# Patient Record
Sex: MALE | Race: WHITE
[De-identification: names, ages, dates, MRNs, and addresses within clinical notes are randomized per-mention and may not be internally consistent; named-entity substitution may affect disease eponyms.]

---

## 2019-02-06 ENCOUNTER — HOSPITAL ENCOUNTER (EMERGENCY)
Dept: HOSPITAL 80 - FED | Age: 24
Discharge: HOME | End: 2019-02-06
Payer: COMMERCIAL

## 2019-02-06 VITALS — SYSTOLIC BLOOD PRESSURE: 127 MMHG | DIASTOLIC BLOOD PRESSURE: 91 MMHG

## 2019-02-06 DIAGNOSIS — S93.602A: Primary | ICD-10-CM
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
## 2019-02-06 NOTE — EDPHY
H & P


Stated Complaint: inj lateral l foot running on treadmill


Time Seen by Provider: 02/06/19 16:24


HPI/ROS: 





Chief Complaint:  Left foot pain





HPI:  The patient presents to the ED with left foot pain after he inverted his 

ankle while running on a treadmill.  He felt a pop.  He denies any associated 

pain in his ankle, knee or back.  He denies additional acute complaints.





REVIEW OF SYSTEMS:


Neuro:  no headache, numbness, weakness


Musculoskeletal:  as above


Skin:  no abrasion or lacerations








Source: Patient


Exam Limitations: No limitations





- Personal History


Current Tetanus Diphtheria and Acellular Pertussis (TDAP): Yes





- Medical/Surgical History


Hx Asthma: No


Hx Chronic Respiratory Disease: No


Hx Diabetes: No


Hx Cardiac Disease: No


Hx Renal Disease: No


Hx Cirrhosis: No


Hx Alcoholism: No


Hx HIV/AIDS: No


Hx Splenectomy or Spleen Trauma: No


Other PMH: APPENDECTOMY





- Social History


Smoking Status: Never smoked





- Physical Exam


Exam: 





General:  No acute distress


Left foot:  Tenderness to palpation over the base of the 5th metatarsal.


Neuro:  Sensation intact to light touch, normal motor function


Vascular:  Normal capillary refill








Constitutional: 


 Initial Vital Signs











Temperature (C)  36.7 C   02/06/19 16:15


 


Heart Rate  94   02/06/19 16:15


 


Respiratory Rate  18   02/06/19 16:15


 


Blood Pressure  127/91 H  02/06/19 16:15


 


O2 Sat (%)  99   02/06/19 16:15








 











O2 Delivery Mode               Room Air














Allergies/Adverse Reactions: 


 





Penicillins Allergy (Verified 12/05/16 16:09)


 








Home Medications: 














 Medication  Instructions  Recorded


 


NK [No Known Home Meds]  02/06/19














Medical Decision Making





- Diagnostics


Imaging Results: 


Left foot x-ray:  Images reviewed by myself:  Negative for acute fracture.


ED Course/Re-evaluation: 





Patient presents to the ED for evaluation of left foot pain following an 

inversion injury.  The patient was noted to be neurologically intact.  X-rays 

demonstrate no evidence of an obvious fracture.  The patient will be discharged 

home with a [place] boot.  He is advised to follow up with Orthopedic surgery 

for any unimproved symptoms as this may be the sign of an injury not noted on 

the x-ray today.


Differential Diagnosis: 





Differential diagnosis considered includes fracture, sprain, dislocation











Departure





- Departure


Disposition: Home, Routine, Self-Care


Clinical Impression: 


Foot sprain


Qualifiers:


 Encounter type: initial encounter Laterality: left Qualified Code(s): S93.602A 

- Unspecified sprain of left foot, initial encounter





Condition: Good


Instructions:  Foot Sprain (ED)


Additional Instructions: 


1. Take Ibuprofen or Motrin 600 mg by mouth three times a day.


2. Follow up with the orthopedic surgeon you have been referred to for pain 

which persists past 5-7 days as this may be the sign of an injury not noted on 

the x-ray today.


3. Wear [place] boot as needed for comfort  


4. There is no evidence of an obvious fracture noted on the x-ray today.








Referrals: 


Diego Bartholomew MD [Medical Doctor] - As per Instructions